# Patient Record
(demographics unavailable — no encounter records)

---

## 2024-11-11 NOTE — HISTORY OF PRESENT ILLNESS
[FreeTextEntry1] : Patient with diarrhea, fecal urgency fecal incontinence for the almost 4 years.  No improvement with Imodium plus dicyclomine.  Tried Questran but "tasted bad".  Colonoscopy in 2023 showed a normal terminal ileum, biopsies for microscopic colitis were negative.  Stool studies were negative TSH was negative celiac was negative.  Patient was better with Lomotil however there was a shortage and she was unable to get it.  Patient is currently taking Bentyl twice daily and still has diarrhea.

## 2024-11-11 NOTE — ASSESSMENT
[FreeTextEntry1] : A/P Patient with chronic diarrhea for 2 to 3 years Will check a fecal elastase to rule out pancreatic insufficiency Will continue dicyclomine however I encouraged patient to take it 4 times daily. May take Imodium as needed Was not able to tolerate Questran Will consider Librax if dicyclomine 4 times daily does not work. Follow-up in 3 months

## 2024-11-11 NOTE — PHYSICAL EXAM
[Alert] : alert [Normal Voice/Communication] : normal voice/communication [Healthy Appearing] : healthy appearing [No Respiratory Distress] : no respiratory distress [No Acc Muscle Use] : no accessory muscle use [Respiration, Rhythm And Depth] : normal respiratory rhythm and effort [Auscultation Breath Sounds / Voice Sounds] : lungs were clear to auscultation bilaterally [Heart Rate And Rhythm] : heart rate was normal and rhythm regular [Normal S1, S2] : normal S1 and S2 [Abdomen Tenderness] : non-tender [Bowel Sounds] : normal bowel sounds [No Masses] : no abdominal mass palpated [Abdomen Soft] : soft

## 2025-01-14 NOTE — HISTORY OF PRESENT ILLNESS
[Obstructive Sleep Apnea] : obstructive sleep apnea [TextBox_4] : History of LANI CPAP broken beyond repair Wants new study and new machine  9/29/20 Before divorce [facility] H/O BONNY BSO for cervical cancer Stopped smoking in 1990 12/14/20 Depressed Sleeping better on APAP In bed almost 10 hrs/day  7/6/21 Flat affect Sleeping better on APAP In bed almost 10 hrs/day  10/5/21 Nasal bridge irritation  7/11/23 Unhappy with FFM   10/26/23 Forgetful Unable to use nasal mask - connection issue - fixed by me today  4/2/24 Mask fit issues persist Wanted a nasal mask with chin strap Sampled with [employer] [facility] in small.   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

## 2025-01-14 NOTE — PHYSICAL EXAM
[No Acute Distress] : no acute distress [Low Lying Soft Palate] : low lying soft palate [Enlarged Base of the Tongue] : enlarged base of the tongue [Retrognathia] : retrognathia [III] : Mallampati Class: III [Normal Appearance] : normal appearance [Neck Circumference: ___] : neck circumference: [unfilled] [No Neck Mass] : no neck mass [Normal Rate/Rhythm] : normal rate/rhythm [Normal S1, S2] : normal s1, s2 [No Murmurs] : no murmurs [No Resp Distress] : no resp distress [Clear to Auscultation Bilaterally] : clear to auscultation bilaterally [No Abnormalities] : no abnormalities [Benign] : benign [Normal Gait] : normal gait [No Clubbing] : no clubbing [No Edema] : no edema [Normal Color/ Pigmentation] : normal color/ pigmentation [No Focal Deficits] : no focal deficits [Oriented x3] : oriented x3 [Normal Affect] : normal affect [TextBox_2] : obese

## 2025-01-14 NOTE — DISCUSSION/SUMMARY
[FreeTextEntry1] : IMP Obesity Severe LANI CPAP failure  Plan  Weight loss New WP HST 6-week FU for oral appliance or INSPIRE referral

## 2025-02-27 NOTE — PHYSICAL EXAM

## 2025-02-27 NOTE — HISTORY OF PRESENT ILLNESS
[FreeTextEntry1] : CHANDA PAREDES is a 63 year old female with PMH of LANI, depression, HLD, presenting today for a follow up visit for IBS with diarrhea. Last seen in November with complaints of chronic diarrhea, fecal urgency and fecal incontinence for several years. She had previously tried Questran but was unable to tolerate it due to the taste. She was also on Lomotil at some point with good relief but there was a shortage and she was unable to continue it. She is reporting 3 episodes of diarrhea daily along with fecal incontinence. Colonoscopy in 2023 was negative for polyps and microscopic colitis. Currently taking Dicyclomine QID with little relief. Denies abdominal pain, rectal bleeding. Stool studies are negative.  Patient notes recent bout of depression.  Was not taking much p.o. and had some weight loss

## 2025-02-27 NOTE — ADDENDUM
[FreeTextEntry1] : I, Karina Irvin NP, acted as scribe for Dr. Nat Auguste for this patient encounter

## 2025-02-27 NOTE — ASSESSMENT
[FreeTextEntry1] : 63 year old female with irritable bowel syndrome with diarrhea, however there may also be an element of pelvic floor dysfunction. She will discontinue Dicyclomine and restart Lomotil as it has been more readily available recently. If ineffective may consider Librax at next visit.   Lomotil QID sent to pharmacy Follow up in 3 months  I, Dr. Nat Auguste was present for the history of visit Abdominal exam positive bowel sounds soft nontender I agree with the above assessment and plan